# Patient Record
Sex: MALE | Race: WHITE | NOT HISPANIC OR LATINO
[De-identification: names, ages, dates, MRNs, and addresses within clinical notes are randomized per-mention and may not be internally consistent; named-entity substitution may affect disease eponyms.]

---

## 2021-09-03 PROBLEM — Z00.00 ENCOUNTER FOR PREVENTIVE HEALTH EXAMINATION: Status: ACTIVE | Noted: 2021-09-03

## 2021-09-08 ENCOUNTER — APPOINTMENT (OUTPATIENT)
Dept: NEUROLOGY | Facility: CLINIC | Age: 56
End: 2021-09-08
Payer: COMMERCIAL

## 2021-09-08 VITALS
HEIGHT: 67 IN | WEIGHT: 184 LBS | SYSTOLIC BLOOD PRESSURE: 138 MMHG | DIASTOLIC BLOOD PRESSURE: 89 MMHG | HEART RATE: 70 BPM | TEMPERATURE: 89.2 F | BODY MASS INDEX: 28.88 KG/M2

## 2021-09-08 DIAGNOSIS — Z86.79 PERSONAL HISTORY OF OTHER DISEASES OF THE CIRCULATORY SYSTEM: ICD-10-CM

## 2021-09-08 DIAGNOSIS — Z87.898 PERSONAL HISTORY OF OTHER SPECIFIED CONDITIONS: ICD-10-CM

## 2021-09-08 DIAGNOSIS — F07.81 POSTCONCUSSIONAL SYNDROME: ICD-10-CM

## 2021-09-08 PROCEDURE — 99214 OFFICE O/P EST MOD 30 MIN: CPT

## 2021-09-08 RX ORDER — ESCITALOPRAM OXALATE 10 MG/1
10 TABLET ORAL
Qty: 30 | Refills: 5 | Status: DISCONTINUED | COMMUNITY
Start: 2021-09-08 | End: 2021-09-08

## 2021-09-09 NOTE — CONSULT LETTER
[Dear  ___] : Dear  [unfilled], [Courtesy Letter:] : I had the pleasure of seeing your patient, [unfilled], in my office today. [Please see my note below.] : Please see my note below. [Sincerely,] : Sincerely, [FreeTextEntry3] : Shana Camara MD \par [facility] \par [phone]

## 2021-09-09 NOTE — ASSESSMENT
[FreeTextEntry1] : Would recommend for him to refrain from working for 2 weeks \par Recommend cognitive, physical rest \par Routine and ambulatory EEG\par Can return to work on a part time basis after 2 weeks or can return on a full-time basis - will reassess in 2 weeks\par Tylenol prn headaches , no aspirin \par \par Requested antidepressant but will hold at this time given possible platelet dysfunction - can revisit in ~ month \par Recommend physical therapy \par Will get repeat CT head one week before appointment with Dr. Butt \par Will need surveillance imaging for intraventricular lesion – low grade, central neurocytoma ? \par \par RTC in 2 months -telehealth visit okay

## 2021-09-09 NOTE — DATA REVIEWED
[de-identified] : \par ____________________________________________________________________\par Head CT without contrast 8/29/2021\par Thin helical axial sections through the head obtained from skull base to\par vertex without contrast. Coronal and sagittal reconstructed images provided.\par There is a acute right high parietal convexity subdural hematoma measuring 7\par mm in width on coronal image 53 of series 601. There is slight mass effect on\par adjacent right parietal cortex. There is adjacent small area of right frontal\par subarachnoid hemorrhage at the vertex measuring 1 cm in axial image 49 of\par series 3. There are no other suspected areas of acute intracranial hemorrhage.\par There is no focal or diffuse edema.\par There is prominence of cortical sulci, fissures and ventricles related to\par involutional changes. There is no hydrocephalus. There is a indeterminant\par right lateral intraventricular 1.4 cm lesion with central cystic focus\par contiguous with the lateral ventricular ependymal wall. The finding may\par represent a subependymoma, central neurocytoma or other etiologies.\par There is no large vessel/MCA hyperdense sign.\par IMPRESSION:\par Acute small right high parietal subdural hematoma. Small right frontal\par subarachnoid hemorrhage of vertex.\par No acute calvarial fracture.\par No cerebral edema.\par Indeterminant 1.4 cm intraventricular lesion within the right lateral\par ventricle of indeterminate etiology. Differential considerations include\par subependymoma, central neurocytoma or other etiologies. No hydrocephalus. A\par follow-up elective MRI brain without and with contrast may prove helpful.\par \par IMAGING FINDINGS: The patient admitted with acute right convexity subdural\par hematoma and right frontal traumatic subarachnoid hemorrhage. Mechanism of\par injury not determined. Follow-up right lateral ventricular lesion demonstrated\par on MRI and CT brain 2/5/2019.\par  [de-identified] : \par IMAGING FINDINGS: Thin right parietal convexity subdural hematoma and small\par focus of peripheral subarachnoid blood in the right parietal lobe are not\par significantly changed from CT scan earlier today. Susceptibility weighted MRI\par demonstrates 2 punctate foci of low signal in right frontal cortex (images\par 42-43 series 6) that were not present on previous MRI 2019. Susceptibility\par weighted image also demonstrates 3 foci of low signal in the right parietal\par white matter (images 32-33 series 6) not present in 2019. These new foci of\par low signal likely represent punctate foci of recent parenchymal hemorrhage.\par There is no vasogenic edema adjacent to areas of hemorrhage. \par Since previous MRI mild, diffuse, convexity pachymeningeal enhancement has\par developed, a finding that is not unexpected after recent trauma with subdural\par hemorrhage. No abnormal intra-axial enhancement is demonstrated.\par Small focus of low signal on susceptibility weighted imaging in the head of\par the left caudate nucleus is unchanged (in retrospect) since 2019 MRI. The\par finding may represent a focus of hemosiderin from old hemorrhage or focus of\par calcification.\par Nonenhancing lesion in the right lateral ventricle is again demonstrated. On\par axial image 17 series 4 of the current study, the lesion measures 13 mm in AP\par diameter by 12 mm in width. The lesion measures 9 mm in height on sagittal\par image 15 series 9. Those measurements are minimally larger (1 mm in each\par dimension) than the equivalent measurements obtained at time of the previous\par MRI 2019. Central cystic area is unchanged. 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 There is no marked\par enlargement, enhancement or surrounding edema to suggest an aggressive\par neoplasm.\par There is no hydrocephalus.\par There is no extra-axial collection.\par No neoplastic replacement of bone marrow is demonstrated.\par No orbital abnormality is demonstrated.\par There is minimal left maxillary sinus mucosal thickening with a small\par associated retention cyst. Previous study demonstrated a moderate sized left\par maxillary sinus retention cyst.\par Mastoid air cells are unremarkable.\par IMPRESSION: No significant change in thin right convexity subdural hematoma\par and small peripheral right parietal subarachnoid hemorrhage consistent with\par posttraumatic subarachnoid hemorrhage. MRI susceptibility weighted imaging\par demonstrates a few foci of parenchymal low signal in the right cerebral\par hemisphere consistent with recent traumatic cerebral hemorrhages not\par visualized on recent CT scan studies (or previous MRI 2019).\par Minimal enlargement of right lateral ventricular lesion since 2019 indicates\par the lesion is not related to an aggressive neoplasm. Recommend continued\par follow-up.\par

## 2021-09-09 NOTE — REASON FOR VISIT
[Consultation] : a consultation visit [FreeTextEntry1] : Brain bleed since then feels headaches,dizziness,memory problem

## 2021-09-09 NOTE — DISCUSSION/SUMMARY
[FreeTextEntry1] : Aravind Lopez is a pleasant 56-year-old right-handed gentleman who had an unwitnessed episode of possible head injury with loss of consciousness. Unclear if he fell off the horse although it is possible given description of events. He does not remember riding the horse back to the barn or taking off saddle, etc. He was found on the ground with tongue bite and dent in right side of his helmet. He was very confused. He had a left periorbital contusion. Likely he had a fall and a right convexity subdural and right frontal traumatic subarachnoid hemorrhage. \par \par Possible he had a seizure after the head injury. Prolactin was elevated. \par \par He reports continued fatigue, band like headache for which he takes Tylenol. He feels dizzy and off balance. Likely he is suffering from post-concussive syndrome. He has headaches, dizziness, fatigue, irritability, insomnia, difficulties concentrating which can be seen with post-concussion syndrome. \par \par Would recommend cognitive and physical rest. Would recommend gradual return to activities. Symptoms are greatest within the first 10 days so would expect improvement soon. He may still have some symptoms at one month. Most patients are expected to recover by three months. \par \par Email Address: [email or web address]\par Partner: [email or web address]\par

## 2021-09-09 NOTE — HISTORY OF PRESENT ILLNESS
[FreeTextEntry1] : 56-year-old right-handed man with a history of kidney stones, vertigo. As per chart notes and patient and ’s report, he was riding a horse on 21. Had a helmet on.  He doesn’t remember riding the horse back to the barn but he was able to put the horse in the barn and took saddle and bridle off.  His helmet had dirt and a dent on the right side.  He was found on the ground. Unclear if he lost consciousness but very likely given lack of memory of the event and dent on right side of helmet. He was very disoriented. He didn't’t recognize his spouse. The employee at the barn had given him three of aspirin.  He was given water but he threw it up. At the hospital he was drowsy and confused. He thought his  mother was alive. He had a tongue bite at the tip of his tongue. \par \par He was taken to [facility]. He was noted to be drowsy. Exam otherwise noted to be non-focal. BP in the ED was 160s systolic. CT head revealed small right SDH and small right subarachnoid hemorrhage without mass effect or shift. An intraventricular cyst was noted on imaging (stable since 2019). Prolactin was elevated at 46.6 and then down-trended to 20.1. \par \par He was loaded with LEV 1 g and continued  mg bid. It was then subsequently increased to 750 mg bid. He had a left periorbital contusion as well. He is now off levetiracetam. As per , it may have made him lethargic. \par \par He was discharge on 21 with outpatient neurology and neurosurgery follow-up after serial scans were stable. \par \par He still reports a feeling of being unsteady on his feet. He has a band like sensation in the back of his head. He takes Tylenol twice a day. He has some memory issues. Feels weak and off balance. He works at a demanding job and feels disoriented, dizzy when he spends more than 15 minutes on the computer. He feels exhausted. Not taking aspirin or a blood thinner. Has a follow-up appointment with Dr. Butt in one month. \par He has no known history of seizures. \par \par Medical History\par \par Vertigo \par Kidney stones \par HTN (new diagnosis at [facility] hospitalization). \par \par \par

## 2021-09-09 NOTE — PHYSICAL EXAM
[FreeTextEntry1] : Mental Status: AxOx3, speech: no dysarthria, euthymic affect, able to do serial sevens. Can calculate 6 quarters in $1.50. \par STM 3/3 immediate, 2/3 at five minutes, \par CN: visual acuity, VFF, blink to confrontation \par III, IV, VI, PERRL, EOMI \par V sensation normal to light touch, pinprick\par VII normal squint vs resistance, normal smile, face symmetric \par VIII: normal hearing\par IX, X normal gag, symmetric palate, uvula raises midline \par XI normal shrug versus resistance and lateralization of head versus resistance \par XII tongue symmetric, normal strength, no tremor or fasciculation \par Motor: full strength \par Sensory: normal to LT and vibration \par Reflexes \par Brachoradialis, biceps, triceps, patella and ankles 2+ \par Plantar flexor response bilaterally \par Coordination: no dysmetria on FNF \par Gait : normal balance and gait

## 2021-09-21 ENCOUNTER — NON-APPOINTMENT (OUTPATIENT)
Age: 56
End: 2021-09-21

## 2021-09-27 NOTE — PHYSICAL EXAM
[General Appearance - Alert] : alert [General Appearance - In No Acute Distress] : in no acute distress [General Appearance - Well Nourished] : well nourished [General Appearance - Well Developed] : well developed [Oriented To Time, Place, And Person] : oriented to person, place, and time [Impaired Insight] : insight and judgment were intact [Affect] : the affect was normal [Mood] : the mood was normal [Cranial Nerves Optic (II)] : visual acuity intact bilaterally,  pupils equal round and reactive to light [Cranial Nerves Oculomotor (III)] : extraocular motion intact [Cranial Nerves Trigeminal (V)] : facial sensation intact symmetrically [Cranial Nerves Facial (VII)] : face symmetrical [Cranial Nerves Vestibulocochlear (VIII)] : hearing was intact bilaterally [Cranial Nerves Glossopharyngeal (IX)] : tongue and palate midline [Cranial Nerves Accessory (XI - Cranial And Spinal)] : head turning and shoulder shrug symmetric [Motor Tone] : muscle tone was normal in all four extremities [Motor Strength] : muscle strength was normal in all four extremities [Sensation Tactile Decrease] : light touch was intact [Abnormal Walk] : normal gait [Balance] : balance was intact

## 2021-09-28 ENCOUNTER — APPOINTMENT (OUTPATIENT)
Dept: NEUROSURGERY | Facility: CLINIC | Age: 56
End: 2021-09-28
Payer: COMMERCIAL

## 2021-09-28 PROCEDURE — 99215 OFFICE O/P EST HI 40 MIN: CPT

## 2021-09-28 NOTE — HISTORY OF PRESENT ILLNESS
[de-identified] : Aravind Lopez presents to the office today status post hospital admission for TBI with CT evidence for acute right subdural hematoma and hemorrhagic contusion which did not require neurosurgical intervention. He has a PMHx of of a known right lateral ventricular lesion, vertigo, and kidney stones. Patient presented to [facility] on 8/29 for altered mental status associated with weakness, dizziness, and vomiting following a likely unwitnessed fall from a horse. Last well known was at 5:30 AM that morning. He then went horseback riding and was later found to be confused while sitting on his couch. Patient is amnestic to these events. There was notable dirt on  patient's riding helmet suggesting the fall. 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 Patient has been noncompliant with follow up for this. \par \par At the time of this most recent hospitalization, CT head demonstrated a small acute right frontoparietal subdural hematoma with an adjacent hemorrhagic contusion as well as the right lateral intraventricular lesion. MRI brain demonstrated this lesion to be stable versus minimally enlarged when compared directly to 2019. Surveillance CTs were stable and the patient was subsequently discharged 9/1/2021 with resolved confusion. Keppra was prescribed for one week for antiseizure management and has subsequently been discontinued. \par \par Today the patient endorsed his symptoms have completely resolved. He returned to work yesterday. He continues to participate in physical therapy three times a week with move improvement of generalized weakness. He has scheduled for an eeg tomorrow with a follow up appointment under the direction of Dr. Camara. He is no longer prescribed keppra .He denies  headaches, confusion,  dizziness, n/v, visual disturbances, or gait instability. \par

## 2021-09-28 NOTE — ASSESSMENT
[FreeTextEntry1] : Mr. Lopez is a 56 year old male status post mild traumatic brain injury with loss of consciousness likely secondary to a fall from a horse. He is currently asymptomatic. Surveillance CT 9/27/2021 reviewed independently by me demonstrates interval resolution of right sided subdural hematoma and hemorrhagic contusion with interval development of LEFT sided subacute on chronic left convexity subdural hematoma with evidence for local brain compression. Of note, the intraventricular mass lesion within the left lateral ventricle remains stable.\par \par Natural history discussed with the patient and with his partner who was present by phone. Alternative management strategies reviewed, including continued observation with serial imaging, surgical evacuation, endovascular middle meningeal artery embolization, along with attendant risks and benefits of each approach. Risks of diagnostic cerebral angioraphy with endovascular meningeal artery embolization including but not limited to bleeding, infection, vascular injury, limb ischemia, need for surgical repair, life-threatening hematoma, radiation exposure, renal failure, stroke/coma/death, weakness/paralysis, visual/sensory loss, loss of speech/language/cognitive/memory function, changes in personality or behavior, failure of procedure, recurrence, need for subsequent procedures discussed. The patient and his partner understand the risks, benefits, and alternatives, and wish for us to proceed. We will therefore schedule Mr. Lopez for diagnostic cerebral angiogram under general anesthesia with possible left endovascular middle meningeal artery embolization in the near future.  \par \par I recommend continued engagement in physical therapy with the rehabilitation team to maximize functional recovery at this time. I have also recommended surveillance MRI brain with and without gadolinium in 1 year with an appointment to follow relative to the patient's right lateral intraventricular lesion. \par \par I have asked the patient to contact me for any symptomatic development or progression in the interim at which time we will obtain expedited follow up imaging. \par \par A total of 60 minutes were spent relative to this encounter.

## 2021-09-28 NOTE — DATA REVIEWED
[de-identified] : CT HEAD WITHOUT CONTRAST : 9/27/2021: IMPRESSION -  New subacute small to moderate left frontal convexity subdural hematoma \par  measuring up to 2.1 cm in diameter with mild mass effect on left frontal lobe. No midline shift. Recommend short interval follow-up head CT.  Resolution of previous right parietal convexity subdural and subarachnoid hemorrhage.  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 \par \par CT HEAD WITHOUT CONTRAST : 8/29/2021: IMPRESSION - Acute small right high parietal subdural hematoma. Small right frontal\par subarachnoid hemorrhage of vertex.\par \par No acute calvarial fracture.\par \par No cerebral edema.\par \par Indeterminant 1.4 cm intraventricular lesion within the right lateral\par ventricle of indeterminate etiology. Differential considerations include\par subependymoma, central neurocytoma or other etiologies. No hydrocephalus. A\par follow-up elective MRI brain without and with contrast may prove helpful. [de-identified] : MRI BRAIN WITH AND WITHOUT CONTRAST: 8/29/2021: IMPRESSION - No significant change in thin right convexity subdural hematoma\par and small peripheral right parietal subarachnoid hemorrhage consistent with\par posttraumatic subarachnoid hemorrhage. MRI susceptibility weighted imaging\par demonstrates a few foci of parenchymal low signal in the right cerebral\par hemisphere consistent with recent traumatic cerebral hemorrhages not\par visualized on recent CT scan studies (or previous MRI 2019).\par \par Minimal enlargement of right lateral ventricular lesion since 2019 indicates\par the lesion is not related to an aggressive neoplasm. Recommend continued\par follow-up.

## 2021-09-30 ENCOUNTER — APPOINTMENT (OUTPATIENT)
Dept: NEUROLOGY | Facility: CLINIC | Age: 56
End: 2021-09-30

## 2021-10-01 ENCOUNTER — APPOINTMENT (OUTPATIENT)
Dept: NEUROLOGY | Facility: CLINIC | Age: 56
End: 2021-10-01

## 2021-10-07 ENCOUNTER — RESULT REVIEW (OUTPATIENT)
Age: 56
End: 2021-10-07

## 2021-10-07 ENCOUNTER — APPOINTMENT (OUTPATIENT)
Dept: NEUROSURGERY | Facility: HOSPITAL | Age: 56
End: 2021-10-07

## 2021-10-08 ENCOUNTER — APPOINTMENT (OUTPATIENT)
Dept: NEUROLOGY | Facility: CLINIC | Age: 56
End: 2021-10-08

## 2021-10-08 ENCOUNTER — RESULT REVIEW (OUTPATIENT)
Age: 56
End: 2021-10-08

## 2021-10-15 ENCOUNTER — APPOINTMENT (OUTPATIENT)
Dept: NEUROLOGY | Facility: CLINIC | Age: 56
End: 2021-10-15
Payer: COMMERCIAL

## 2021-10-16 PROCEDURE — 95700 EEG CONT REC W/VID EEG TECH: CPT

## 2021-10-16 PROCEDURE — 95719 EEG PHYS/QHP EA INCR W/O VID: CPT

## 2021-10-16 PROCEDURE — 95708 EEG WO VID EA 12-26HR UNMNTR: CPT

## 2021-10-18 ENCOUNTER — APPOINTMENT (OUTPATIENT)
Dept: NEUROLOGY | Facility: CLINIC | Age: 56
End: 2021-10-18

## 2021-10-26 ENCOUNTER — APPOINTMENT (OUTPATIENT)
Dept: NEUROSURGERY | Facility: CLINIC | Age: 56
End: 2021-10-26
Payer: COMMERCIAL

## 2021-10-26 PROCEDURE — 99214 OFFICE O/P EST MOD 30 MIN: CPT

## 2021-10-26 NOTE — PHYSICAL EXAM
[General Appearance - Alert] : alert [General Appearance - In No Acute Distress] : in no acute distress [General Appearance - Well Nourished] : well nourished [General Appearance - Well Developed] : well developed [Oriented To Time, Place, And Person] : oriented to person, place, and time [Impaired Insight] : insight and judgment were intact [Affect] : the affect was normal [Mood] : the mood was normal [Cranial Nerves Optic (II)] : visual acuity intact bilaterally,  pupils equal round and reactive to light [Cranial Nerves Oculomotor (III)] : extraocular motion intact [Cranial Nerves Trigeminal (V)] : facial sensation intact symmetrically [Cranial Nerves Facial (VII)] : face symmetrical [Cranial Nerves Vestibulocochlear (VIII)] : hearing was intact bilaterally [Cranial Nerves Glossopharyngeal (IX)] : tongue and palate midline [Cranial Nerves Accessory (XI - Cranial And Spinal)] : head turning and shoulder shrug symmetric [Cranial Nerves Hypoglossal (XII)] : there was no tongue deviation with protrusion [Motor Tone] : muscle tone was normal in all four extremities [Motor Strength] : muscle strength was normal in all four extremities [Sensation Tactile Decrease] : light touch was intact [Abnormal Walk] : normal gait [Balance] : balance was intact

## 2021-11-01 ENCOUNTER — APPOINTMENT (OUTPATIENT)
Dept: NEUROLOGY | Facility: CLINIC | Age: 56
End: 2021-11-01
Payer: COMMERCIAL

## 2021-11-01 PROCEDURE — 99215 OFFICE O/P EST HI 40 MIN: CPT | Mod: 95

## 2021-11-01 NOTE — PHYSICAL EXAM
[General Appearance - Alert] : alert [General Appearance - In No Acute Distress] : in no acute distress [General Appearance - Well Nourished] : well nourished [General Appearance - Well Developed] : well developed [Oriented To Time, Place, And Person] : oriented to person, place, and time [Impaired Insight] : insight and judgment were intact [Affect] : the affect was normal [Cranial Nerves Optic (II)] : visual acuity intact bilaterally,  pupils equal round and reactive to light [Cranial Nerves Oculomotor (III)] : extraocular motion intact [Cranial Nerves Trigeminal (V)] : facial sensation intact symmetrically [Cranial Nerves Facial (VII)] : face symmetrical [Cranial Nerves Vestibulocochlear (VIII)] : hearing was intact bilaterally [Cranial Nerves Glossopharyngeal (IX)] : tongue and palate midline [Cranial Nerves Accessory (XI - Cranial And Spinal)] : head turning and shoulder shrug symmetric [Cranial Nerves Hypoglossal (XII)] : there was no tongue deviation with protrusion [Motor Tone] : muscle tone was normal in all four extremities [Motor Strength] : muscle strength was normal in all four extremities [Sensation Tactile Decrease] : light touch was intact [Abnormal Walk] : normal gait [Balance] : balance was intact [FreeTextEntry1] : Telehealth Visit : \par Abbreviated exam; \par Face symmetric.  Speech fluent. Good  historian. \par No clinical activity concerning for seizure

## 2021-11-01 NOTE — DATA REVIEWED
[de-identified] : MRI brain 8/30/21 \par IMAGING FINDINGS: Thin right parietal convexity subdural hematoma and small\par focus of peripheral subarachnoid blood in the right parietal lobe are not\par significantly changed from CT scan earlier today. Susceptibility weighted MRI\par demonstrates 2 punctate foci of low signal in right frontal cortex (images\par 42-43 series 6) that were not present on previous MRI 2019. Susceptibility\par weighted image also demonstrates 3 foci of low signal in the right parietal\par white matter (images 32-33 series 6) not present in 2019. These new foci of\par low signal likely represent punctate foci of recent parenchymal hemorrhage.\par There is no vasogenic edema adjacent to areas of hemorrhage. \par Since previous MRI mild, diffuse, convexity pachymeningeal enhancement has\par developed, a finding that is not unexpected after recent trauma with subdural\par hemorrhage. No abnormal intra-axial enhancement is demonstrated.\par Small focus of low signal on susceptibility weighted imaging in the head of\par the left caudate nucleus is unchanged (in retrospect) since 2019 MRI. The\par finding may represent a focus of hemosiderin from old hemorrhage or focus of\par calcification.\par Nonenhancing lesion in the right lateral ventricle is again demonstrated. On\par axial image 17 series 4 of the current study, the lesion measures 13 mm in AP\par diameter by 12 mm in width. The lesion measures 9 mm in height on sagittal\par image 15 series 9. Those measurements are minimally larger (1 mm in each\par dimension) than the equivalent measurements obtained at time of the previous\par MRI 2019. Central cystic area is unchanged. 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 There is no marked\par enlargement, enhancement or surrounding edema to suggest an aggressive\par neoplasm.\par There is no hydrocephalus.\par There is no extra-axial collection.\par No neoplastic replacement of bone marrow is demonstrated.\par No orbital abnormality is demonstrated.\par There is minimal left maxillary sinus mucosal thickening with a small\par associated retention cyst. Previous study demonstrated a moderate sized left\par maxillary sinus retention cyst.\par Mastoid air cells are unremarkable.\par IMPRESSION: No significant change in thin right convexity subdural hematoma\par and small peripheral right parietal subarachnoid hemorrhage consistent with\par posttraumatic subarachnoid hemorrhage. MRI susceptibility weighted imaging\par demonstrates a few foci of parenchymal low signal in the right cerebral\par hemisphere consistent with recent traumatic cerebral hemorrhages not\par visualized on recent CT scan studies (or previous MRI 2019).\par Minimal enlargement of right lateral ventricular lesion since 2019 indicates\par the lesion is not related to an aggressive neoplasm. Recommend continued\par follow-up.\par  [de-identified] : 10/22/21: Stable left frontal subdural collection. Stable right ventricular mass. \par \par 10/8/21: CT head: \par Decrease in size of the left frontal convexity subdural collection as compared to the immediate prior CT scan as further described above now measuring 15 mm as compared to 21 mm when measured in similar coronal imaging planes\par Stable or slightly improved rightward midline shift\par Unchanged appearance of the right lateral ventricle mass lesion as compared to recent prior exams.\par \par ____________________________________________________________________\par Head CT without contrast 8/29/2021\par Thin helical axial sections through the head obtained from skull base to\par vertex without contrast. Coronal and sagittal reconstructed images provided.\par There is a acute right high parietal convexity subdural hematoma measuring 7\par mm in width on coronal image 53 of series 601. There is slight mass effect on\par adjacent right parietal cortex. There is adjacent small area of right frontal\par subarachnoid hemorrhage at the vertex measuring 1 cm in axial image 49 of\par series 3. There are no other suspected areas of acute intracranial hemorrhage.\par There is no focal or diffuse edema.\par There is prominence of cortical sulci, fissures and ventricles related to\par involutional changes. There is no hydrocephalus. There is a indeterminant\par right lateral intraventricular 1.4 cm lesion with central cystic focus\par contiguous with the lateral ventricular ependymal wall. The finding may\par represent a subependymoma, central neurocytoma or other etiologies.\par There is no large vessel/MCA hyperdense sign.\par IMPRESSION:\par Acute small right high parietal subdural hematoma. Small right frontal\par subarachnoid hemorrhage of vertex.\par No acute calvarial fracture.\par No cerebral edema.\par Indeterminant 1.4 cm intraventricular lesion within the right lateral\par ventricle of indeterminate etiology. Differential considerations include\par subependymoma, central neurocytoma or other etiologies. No hydrocephalus. A\par follow-up elective MRI brain without and with contrast may prove helpful.\par \par IMAGING FINDINGS: The patient admitted with acute right convexity subdural\par hematoma and right frontal traumatic subarachnoid hemorrhage. Mechanism of\par injury not determined. Follow-up right lateral ventricular lesion demonstrated\par on MRI and CT brain 2/5/2019.\par

## 2021-11-01 NOTE — ASSESSMENT
[FreeTextEntry1] : -Repeat routine and ambulatory EEG in 2/2021 (will do at [facility]) \par -No driving pending repeat EEG. Anticipate driving will be okay in March 2022 if EEG still without epileptiform patterns.\par - No heavy lifting, vigorous exercising, no horseback riding for now \par - Follow-up with Dr. Butt in 12/2021 after CT head without contrast (ordered). \par \par Discussed in detail seizure safety\par \par no driving\par no baths alone (can drown)\par no swimming alone \par no heights, heavy machinery\par travel is okay but do not lock door on plane \par \par Return to clinic March - TEB okay \par \par \par

## 2021-11-01 NOTE — HISTORY OF PRESENT ILLNESS
[Home] : at home, [unfilled] , at the time of the visit. [Medical Office: ([facility])___] : at the medical office located in  [Verbal consent obtained from patient] : the patient, [unfilled] [FreeTextEntry1] : Last seen in clinic on 21. Doing okay. In the interval, he was noted to have a new left subdural hemorrhage. \par This was thought to possibly be a sequelae of head injury from 21 - delayed development of left subdural. \par \par He was admitted 10/7/21 to [place] for elective embolization of left chronic subdural hemorrhage. He had a cerebral angiogram and left MMA embolization preformed with Dr. Butt. \par \par Overall, since then, he is doing very well. Back to working remotely full time. No headaches, no difficulties with concentration. No falls. Not taking anti-epileptic medication. Presenting to review results of ambulatory EEG. \par _________________________________________\par \par 56-year-old right-handed man with a history of kidney stones, vertigo. As per chart notes and patient and ’s report, he was riding a horse on 21. Had a helmet on.  He doesn't remember riding the horse back to the barn but he was able to put the horse in the barn and took saddle and bridle off.  His helmet had dirt and a dent on the right side.  He was found on the ground. Unclear if he lost consciousness but very likely given lack of memory of the event and dent on right side of helmet. He was very disoriented. He didn't’t recognize his spouse. The employee at the barn had given him three of aspirin.  He was given water but he threw it up. At the hospital he was drowsy and confused. He thought his  mother was alive. He had a tongue bite at the tip of his tongue. \par \par He was taken to [facility]. He was noted to be drowsy. Exam otherwise noted to be non-focal. BP in the ED was 160s systolic. CT head revealed small right SDH and small right subarachnoid hemorrhage without mass effect or shift. An intraventricular cyst was noted on imaging (stable since 2019). Prolactin was elevated at 46.6 and then down-trended to 20.1. \par \par He was loaded with LEV 1 g and continued  mg bid. It was then subsequently increased to 750 mg bid. He had a left periorbital contusion as well. He is now off levetiracetam. As per , it may have made him lethargic. \par \par He was discharge on 21 with outpatient neurology and neurosurgery follow-up after serial scans were stable. \par \par He still reports a feeling of being unsteady on his feet. He has a band like sensation in the back of his head. He takes Tylenol twice a day. He has some memory issues. Feels weak and off balance. He works at a demanding job and feels disoriented, dizzy when he spends more than 15 minutes on the computer. He feels exhausted. Not taking aspirin or a blood thinner. Has a follow-up appointment with Dr. Butt in one month. \par He has no known history of seizures. \par \par Medical History\par \par Vertigo \par Kidney stones \par HTN (new diagnosis at [facility] hospitalization). \par \par \par

## 2021-11-01 NOTE — DISCUSSION/SUMMARY
[FreeTextEntry1] : Aravind Lopez is a pleasant 56-year-old right-handed gentleman who had an unwitnessed episode of possible head injury with loss of consciousness. Unclear if he fell off the horse although it is possible given description of events. He does not remember riding the horse back to the barn or taking off saddle, etc. He was found on the ground with tongue bite and dent in right side of his helmet. He was very confused. He had a left periorbital contusion. Likely he had a fall and a right convexity subdural and right frontal traumatic subarachnoid hemorrhage. \par \par Possible he had a seizure after the head injury. Prolactin was elevated. \par \par He reports continued fatigue, band like headache for which he takes Tylenol. He feels dizzy and off balance. Likely he is suffering from post-concussive syndrome. He has headaches, dizziness, fatigue, irritability, insomnia, difficulties concentrating which can be seen with post-concussion syndrome. \par \par Would recommend cognitive and physical rest. Would recommend gradual return to activities. Symptoms are greatest within the first 10 days so would expect improvement soon. He may still have some symptoms at one month. Most patients are expected to recover by three months. \par \par At 11/1/21 fatigue and sense of feeling off balance has mostly resolved. In the interval, on surveillance imaging, he was discovered to have a left subacute on chronic subdural hemorrhage which was likely a delayed subdural from head injury on 8/29/21. Status post angiogram and left MMA embolization on 10/7/21.  \par \par He is doing well. Not on AEDs. Not driving. Back to work (remote). Denies memory/cognitive difficulties. \par EEG on 10/15/21 with occasional bifrontal synchronous and independent left frontal slowing L>R. No epileptiform patterns were seen. \par \par Email Address: [email or web address]\par Partner: [email or web address]\par

## 2021-12-08 ENCOUNTER — NON-APPOINTMENT (OUTPATIENT)
Age: 56
End: 2021-12-08

## 2021-12-08 ENCOUNTER — APPOINTMENT (OUTPATIENT)
Dept: NEUROSURGERY | Facility: CLINIC | Age: 56
End: 2021-12-08
Payer: COMMERCIAL

## 2021-12-08 PROCEDURE — 99442: CPT | Mod: 95

## 2022-02-11 ENCOUNTER — APPOINTMENT (OUTPATIENT)
Dept: NEUROLOGY | Facility: CLINIC | Age: 57
End: 2022-02-11
Payer: COMMERCIAL

## 2022-02-11 PROCEDURE — 95816 EEG AWAKE AND DROWSY: CPT

## 2022-02-12 PROCEDURE — 95719 EEG PHYS/QHP EA INCR W/O VID: CPT

## 2022-02-12 PROCEDURE — 95700 EEG CONT REC W/VID EEG TECH: CPT

## 2022-02-12 PROCEDURE — 95708 EEG WO VID EA 12-26HR UNMNTR: CPT

## 2022-02-14 ENCOUNTER — APPOINTMENT (OUTPATIENT)
Dept: NEUROLOGY | Facility: CLINIC | Age: 57
End: 2022-02-14

## 2022-02-22 ENCOUNTER — APPOINTMENT (OUTPATIENT)
Dept: NEUROLOGY | Facility: CLINIC | Age: 57
End: 2022-02-22
Payer: COMMERCIAL

## 2022-02-22 DIAGNOSIS — S06.5X9A TRAUMATIC SUBDURAL HEMORRHAGE WITH LOSS OF CONSCIOUSNESS OF UNSPECIFIED DURATION, INITIAL ENCOUNTER: ICD-10-CM

## 2022-02-22 DIAGNOSIS — S06.349S: ICD-10-CM

## 2022-02-22 PROCEDURE — 99213 OFFICE O/P EST LOW 20 MIN: CPT | Mod: 95

## 2022-02-22 NOTE — PHYSICAL EXAM
[FreeTextEntry1] : Telehealth Visit: \par Mental status: Alert, oriented. Good historian\par Speech fluent, no dysarthria \par Face activates symmetrically

## 2022-02-22 NOTE — ASSESSMENT
[FreeTextEntry1] : Medically cleared for driving: preference for short distances, daytime driving mostly, no long distances for now. \par No small children in the car. No driving if not feeling well, sleep deprived. Pull over immediately if sense something strange. \par \par Okay to ride horse, must be with  and must wear helmet\par \par Call immediately if seizure \par \par Follow-up with Dr. Butt\par \par RTC in six months, TEB is okay \par \par

## 2022-02-22 NOTE — HISTORY OF PRESENT ILLNESS
[FreeTextEntry1] : Last seen in clinic on 2021. Doing well.  No clinical events concerning for seizure. Recent routine and ambulatory EEG from 2022 was normal. No epileptiform patterns or seizures. No slowing. He is not on anti-epileptic medications. \par \par He used to have right posterior headaches and these have improved. He is doing yoga which is helpful. He started horseback riding again. He only rides with a . He always wears a helmet. No jumps. \par \par His concentration is good. He is back at work and doing well. He sometimes forgets the names of people. \par \par He is eager to drive. He spoke with Dr. Butt in December with plan for follow-up of right lateral intraventricular lesion (incidental finding). He will see him again 2022. His CT head from 21 showed resolution of residual left subdural hemorrhage. \par \par _______________________________________\par Last seen in clinic on 21. Doing okay. In the interval, he was noted to have a new left subdural hemorrhage. \par This was thought to possibly be a sequelae of head injury from 21 - delayed development of left subdural. \par \par He was admitted 10/7/21 to [place] for elective embolization of left chronic subdural hemorrhage. He had a cerebral angiogram and left MMA embolization preformed with Dr. Butt. \par \par Overall, since then, he is doing very well. Back to working remotely full time. No headaches, no difficulties with concentration. No falls. Not taking anti-epileptic medication. Presenting to review results of ambulatory EEG. \par _________________________________________\par \par 56-year-old right-handed man with a history of kidney stones, vertigo. As per chart notes and patient and ’s report, he was riding a horse on 21. Had a helmet on.  He doesn't remember riding the horse back to the barn but he was able to put the horse in the barn and took saddle and bridle off.  His helmet had dirt and a dent on the right side.  He was found on the ground. Unclear if he lost consciousness but very likely given lack of memory of the event and dent on right side of helmet. He was very disoriented. He didn't’t recognize his spouse. The employee at the barn had given him three of aspirin.  He was given water but he threw it up. At the hospital he was drowsy and confused. He thought his  mother was alive. He had a tongue bite at the tip of his tongue. \par \par He was taken to [facility]. He was noted to be drowsy. Exam otherwise noted to be non-focal. BP in the ED was 160s systolic. CT head revealed small right SDH and small right subarachnoid hemorrhage without mass effect or shift. An intraventricular cyst was noted on imaging (stable since 2019). Prolactin was elevated at 46.6 and then down-trended to 20.1. \par \par He was loaded with LEV 1 g and continued  mg bid. It was then subsequently increased to 750 mg bid. He had a left periorbital contusion as well. He is now off levetiracetam. As per , it may have made him lethargic. \par \par He was discharge on 21 with outpatient neurology and neurosurgery follow-up after serial scans were stable. \par \par He still reports a feeling of being unsteady on his feet. He has a band like sensation in the back of his head. He takes Tylenol twice a day. He has some memory issues. Feels weak and off balance. He works at a demanding job and feels disoriented, dizzy when he spends more than 15 minutes on the computer. He feels exhausted. Not taking aspirin or a blood thinner. Has a follow-up appointment with Dr. Butt in one month. \par He has no known history of seizures. \par \par Medical History\par \par Vertigo \par Kidney stones \par HTN (new diagnosis at [facility] hospitalization). \par \par \par

## 2022-02-22 NOTE — DATA REVIEWED
[de-identified] : MRI brain 8/30/21 \par IMAGING FINDINGS: Thin right parietal convexity subdural hematoma and small\par focus of peripheral subarachnoid blood in the right parietal lobe are not\par significantly changed from CT scan earlier today. Susceptibility weighted MRI\par demonstrates 2 punctate foci of low signal in right frontal cortex (images\par 42-43 series 6) that were not present on previous MRI 2019. Susceptibility\par weighted image also demonstrates 3 foci of low signal in the right parietal\par white matter (images 32-33 series 6) not present in 2019. These new foci of\par low signal likely represent punctate foci of recent parenchymal hemorrhage.\par There is no vasogenic edema adjacent to areas of hemorrhage. \par Since previous MRI mild, diffuse, convexity pachymeningeal enhancement has\par developed, a finding that is not unexpected after recent trauma with subdural\par hemorrhage. No abnormal intra-axial enhancement is demonstrated.\par Small focus of low signal on susceptibility weighted imaging in the head of\par the left caudate nucleus is unchanged (in retrospect) since 2019 MRI. The\par finding may represent a focus of hemosiderin from old hemorrhage or focus of\par calcification.\par Nonenhancing lesion in the right lateral ventricle is again demonstrated. On\par axial image 17 series 4 of the current study, the lesion measures 13 mm in AP\par diameter by 12 mm in width. The lesion measures 9 mm in height on sagittal\par image 15 series 9. Those measurements are minimally larger (1 mm in each\par dimension) than the equivalent measurements obtained at time of the previous\par MRI 2019. Central cystic area is unchanged. 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 There is no marked\par enlargement, enhancement or surrounding edema to suggest an aggressive\par neoplasm.\par There is no hydrocephalus.\par There is no extra-axial collection.\par No neoplastic replacement of bone marrow is demonstrated.\par No orbital abnormality is demonstrated.\par There is minimal left maxillary sinus mucosal thickening with a small\par associated retention cyst. Previous study demonstrated a moderate sized left\par maxillary sinus retention cyst.\par Mastoid air cells are unremarkable.\par IMPRESSION: No significant change in thin right convexity subdural hematoma\par and small peripheral right parietal subarachnoid hemorrhage consistent with\par posttraumatic subarachnoid hemorrhage. MRI susceptibility weighted imaging\par demonstrates a few foci of parenchymal low signal in the right cerebral\par hemisphere consistent with recent traumatic cerebral hemorrhages not\par visualized on recent CT scan studies (or previous MRI 2019).\par Minimal enlargement of right lateral ventricular lesion since 2019 indicates\par the lesion is not related to an aggressive neoplasm. Recommend continued\par follow-up.\par  [de-identified] : 2/11/2022 - ambulatory EEG: normal \par 10/15/2021: ambulatory EEG: occasional bifrontal independent and synchronous delta slowing L>R. \par  [de-identified] : 12/7/2021 CT head \par  IMPRESSION: \par  Interval resolution of previous noted left frontal convexity subdural \par  hematoma. No new areas of hemorrhage appreciated. \par  Unchanged mass protruding into the anterior right lateral ventricle measuring \par  12 x 11 mm.\par \par 10/22/21: Stable left frontal subdural collection. Stable right ventricular mass. \par \par Decrease in size of the left frontal convexity subdural collection as compared to the immediate prior CT scan as further described above now measuring 15 mm as compared to 21 mm when measured in similar coronal imaging planes\par Stable or slightly improved rightward midline shift\par Unchanged appearance of the right lateral ventricle mass lesion as compared to recent prior exams.\par \par ____________________________________________________________________\par Head CT without contrast 8/29/2021\par Thin helical axial sections through the head obtained from skull base to\par vertex without contrast. Coronal and sagittal reconstructed images provided.\par There is a acute right high parietal convexity subdural hematoma measuring 7\par mm in width on coronal image 53 of series 601. There is slight mass effect on\par adjacent right parietal cortex. There is adjacent small area of right frontal\par subarachnoid hemorrhage at the vertex measuring 1 cm in axial image 49 of\par series 3. There are no other suspected areas of acute intracranial hemorrhage.\par There is no focal or diffuse edema.\par There is prominence of cortical sulci, fissures and ventricles related to\par involutional changes. There is no hydrocephalus. There is a indeterminant\par right lateral intraventricular 1.4 cm lesion with central cystic focus\par contiguous with the lateral ventricular ependymal wall. The finding may\par represent a subependymoma, central neurocytoma or other etiologies.\par There is no large vessel/MCA hyperdense sign.\par IMPRESSION:\par Acute small right high parietal subdural hematoma. Small right frontal\par subarachnoid hemorrhage of vertex.\par No acute calvarial fracture.\par No cerebral edema.\par Indeterminant 1.4 cm intraventricular lesion within the right lateral\par ventricle of indeterminate etiology. Differential considerations include\par subependymoma, central neurocytoma or other etiologies. No hydrocephalus. A\par follow-up elective MRI brain without and with contrast may prove helpful.\par \par IMAGING FINDINGS: The patient admitted with acute right convexity subdural\par hematoma and right frontal traumatic subarachnoid hemorrhage. Mechanism of\par injury not determined. Follow-up right lateral ventricular lesion demonstrated\par on MRI and CT brain 2/5/2019.\par

## 2022-02-22 NOTE — DISCUSSION/SUMMARY
[FreeTextEntry1] : Aravind Lopez is a pleasant 56-year-old right-handed gentleman who had an unwitnessed episode of possible head injury with loss of consciousness. Unclear if he fell off the horse although it is possible given description of events. He does not remember riding the horse back to the barn or taking off saddle, etc. He was found on the ground with tongue bite and dent in right side of his helmet. He was very confused. He had a left periorbital contusion. Likely he had a fall and a right convexity subdural and right frontal traumatic subarachnoid hemorrhage. \par \par Possible he had a seizure after the head injury. Prolactin was elevated. \par \par Following this course was complicated by left frontal subdural hemorrhage s/p MMA embolization on 10/7/21 with Dr. Butt. \par \par Repeat CT head 12/2021 shows resolution of left frontal subdural hemorrhage and right subdural.  He will follow-up with Dr. Butt in September 2022 to follow an incidentally found, currently asymptomatic right lateral ventricular lesion. \par \par Overall he is doing very well. He is back at work and horse-back riding (no jumps, with ) and with a helmet. His follow-up EEG in 2/2022 was normal. He is not on AEDs. No headaches. Concentrating well. \par \par Discussed that it is unclear if he every had a seizure but that it is possible for him to have a seizure in the future. Discussed that a normal ambulatory EEG does not rule out possibility of seizure and he is to call me as soon as possible if he was to have one. \par \par Discussed that driving is okay now but to exercise caution. Will follow-up with him in six months or sooner if needed. \par \par Email Address: [email or web address]\par Partner: [email or web address]\par \par \par

## 2022-05-18 NOTE — DATA REVIEWED
[de-identified] : CT HEAD WITHOUT CONTRAST: 10/22/2021 : IMPRESSION - Stable left frontal subdural collection \par \par  Stable right ventricular mass \par CT HEAD WITHOUT CONTRAST : 10/25/2021: IMPRESSION -\par CT HEAD WITHOUT CONTRAST: 10/8/2021: IMPRESSION- Decrease in size of the left frontal convexity subdural collection as compared to the immediate prior CT scan as further described above now measuring 15 mm as compared to 21 mm when measured in similar coronal imaging planes\par \par Stable or slightly improved rightward midline shift\par \par Unchanged appearance of the right lateral ventricle mass lesion as compared to recent prior exams.

## 2022-05-18 NOTE — DATA REVIEWED
[de-identified] : CT HEAD WITHOUT CONTRAST: 10/22/2021 : IMPRESSION - Stable left frontal subdural collection \par \par  Stable right ventricular mass \par CT HEAD WITHOUT CONTRAST : 10/25/2021: IMPRESSION -\par CT HEAD WITHOUT CONTRAST: 10/8/2021: IMPRESSION- Decrease in size of the left frontal convexity subdural collection as compared to the immediate prior CT scan as further described above now measuring 15 mm as compared to 21 mm when measured in similar coronal imaging planes\par \par Stable or slightly improved rightward midline shift\par \par Unchanged appearance of the right lateral ventricle mass lesion as compared to recent prior exams.

## 2022-05-18 NOTE — HISTORY OF PRESENT ILLNESS
[FreeTextEntry1] : Mr. Lopez presents status post  endovascular middle meningeal artery embolization of left subdural hematoma on 10/7 at [facility] under the direction of myself. His hospital course was uncomplicated with a stable neurological exam. He was discharged home on 10/8. Follow up head CT head ( 10/26)  detailed below. He denies neurological symptoms. \par

## 2022-05-18 NOTE — ASSESSMENT
[FreeTextEntry1] : Mr. Lopez presents status post  endovascular middle meningeal artery embolization of left subdural hematoma. He has made a remarkable recovery. He denies neurological symptoms. Recent CT head demonstrates no interval change of left subdural hematoma and remains stable. I plan to obtain a CT head without contrast in 2 weeks with an appointment to follow. \par \par \par I have asked the patient to contact me for any symptomatic development or progression in the interim at which time we can obtain expedited follow up imaging. \par \par

## 2022-09-27 ENCOUNTER — APPOINTMENT (OUTPATIENT)
Dept: NEUROSURGERY | Facility: CLINIC | Age: 57
End: 2022-09-27